# Patient Record
Sex: MALE | Race: WHITE | Employment: FULL TIME | URBAN - METROPOLITAN AREA
[De-identification: names, ages, dates, MRNs, and addresses within clinical notes are randomized per-mention and may not be internally consistent; named-entity substitution may affect disease eponyms.]

---

## 2017-12-14 ENCOUNTER — APPOINTMENT (OUTPATIENT)
Dept: CV DIAGNOSTICS | Facility: HOSPITAL | Age: 62
DRG: 038 | End: 2017-12-14
Attending: HOSPITALIST

## 2017-12-14 ENCOUNTER — HOSPITAL ENCOUNTER (INPATIENT)
Facility: HOSPITAL | Age: 62
LOS: 5 days | Discharge: HOME | DRG: 038 | End: 2017-12-19
Attending: EMERGENCY MEDICINE | Admitting: SURGERY

## 2017-12-14 ENCOUNTER — APPOINTMENT (OUTPATIENT)
Dept: GENERAL RADIOLOGY | Facility: HOSPITAL | Age: 62
DRG: 038 | End: 2017-12-14
Attending: EMERGENCY MEDICINE

## 2017-12-14 ENCOUNTER — APPOINTMENT (OUTPATIENT)
Dept: CT IMAGING | Age: 62
DRG: 038 | End: 2017-12-14
Attending: EMERGENCY MEDICINE

## 2017-12-14 ENCOUNTER — APPOINTMENT (OUTPATIENT)
Dept: MRI IMAGING | Facility: HOSPITAL | Age: 62
DRG: 038 | End: 2017-12-14
Attending: Other

## 2017-12-14 DIAGNOSIS — I65.23 BILATERAL CAROTID ARTERY STENOSIS: ICD-10-CM

## 2017-12-14 DIAGNOSIS — I63.9 ACUTE CVA (CEREBROVASCULAR ACCIDENT) (HCC): Primary | ICD-10-CM

## 2017-12-14 PROCEDURE — 70496 CT ANGIOGRAPHY HEAD: CPT | Performed by: EMERGENCY MEDICINE

## 2017-12-14 PROCEDURE — 93306 TTE W/DOPPLER COMPLETE: CPT | Performed by: HOSPITALIST

## 2017-12-14 PROCEDURE — 71010 XR CHEST AP PORTABLE  (CPT=71010): CPT | Performed by: EMERGENCY MEDICINE

## 2017-12-14 PROCEDURE — 99291 CRITICAL CARE FIRST HOUR: CPT | Performed by: HOSPITALIST

## 2017-12-14 PROCEDURE — 70551 MRI BRAIN STEM W/O DYE: CPT | Performed by: OTHER

## 2017-12-14 PROCEDURE — 70498 CT ANGIOGRAPHY NECK: CPT | Performed by: EMERGENCY MEDICINE

## 2017-12-14 PROCEDURE — 99291 CRITICAL CARE FIRST HOUR: CPT | Performed by: OTHER

## 2017-12-14 PROCEDURE — 70450 CT HEAD/BRAIN W/O DYE: CPT | Performed by: EMERGENCY MEDICINE

## 2017-12-14 RX ORDER — ASPIRIN 325 MG
325 TABLET ORAL DAILY
Status: DISCONTINUED | OUTPATIENT
Start: 2017-12-15 | End: 2017-12-19

## 2017-12-14 RX ORDER — METOCLOPRAMIDE HYDROCHLORIDE 5 MG/ML
10 INJECTION INTRAMUSCULAR; INTRAVENOUS EVERY 8 HOURS PRN
Status: DISCONTINUED | OUTPATIENT
Start: 2017-12-14 | End: 2017-12-18

## 2017-12-14 RX ORDER — ASPIRIN 300 MG
300 SUPPOSITORY, RECTAL RECTAL ONCE
Status: COMPLETED | OUTPATIENT
Start: 2017-12-14 | End: 2017-12-14

## 2017-12-14 RX ORDER — PHENYLEPHRINE HCL IN 0.9% NACL 50MG/250ML
PLASTIC BAG, INJECTION (ML) INTRAVENOUS CONTINUOUS PRN
Status: DISCONTINUED | OUTPATIENT
Start: 2017-12-14 | End: 2017-12-15

## 2017-12-14 RX ORDER — SODIUM CHLORIDE 9 MG/ML
INJECTION, SOLUTION INTRAVENOUS CONTINUOUS
Status: DISCONTINUED | OUTPATIENT
Start: 2017-12-14 | End: 2017-12-15

## 2017-12-14 RX ORDER — FAMOTIDINE 20 MG/1
20 TABLET ORAL 2 TIMES DAILY
Status: DISCONTINUED | OUTPATIENT
Start: 2017-12-14 | End: 2017-12-15

## 2017-12-14 RX ORDER — ACETAMINOPHEN 325 MG/1
650 TABLET ORAL EVERY 4 HOURS PRN
Status: DISCONTINUED | OUTPATIENT
Start: 2017-12-14 | End: 2017-12-18

## 2017-12-14 RX ORDER — SODIUM CHLORIDE 9 MG/ML
INJECTION, SOLUTION INTRAVENOUS CONTINUOUS
Status: DISCONTINUED | OUTPATIENT
Start: 2017-12-14 | End: 2017-12-14

## 2017-12-14 RX ORDER — SODIUM PHOSPHATE, DIBASIC AND SODIUM PHOSPHATE, MONOBASIC 7; 19 G/133ML; G/133ML
1 ENEMA RECTAL ONCE AS NEEDED
Status: DISCONTINUED | OUTPATIENT
Start: 2017-12-14 | End: 2017-12-18

## 2017-12-14 RX ORDER — DOCUSATE SODIUM 100 MG/1
100 CAPSULE, LIQUID FILLED ORAL 2 TIMES DAILY
Status: DISCONTINUED | OUTPATIENT
Start: 2017-12-14 | End: 2017-12-19

## 2017-12-14 RX ORDER — ONDANSETRON 2 MG/ML
4 INJECTION INTRAMUSCULAR; INTRAVENOUS EVERY 6 HOURS PRN
Status: DISCONTINUED | OUTPATIENT
Start: 2017-12-14 | End: 2017-12-18

## 2017-12-14 RX ORDER — ACETAMINOPHEN 325 MG/1
650 TABLET ORAL EVERY 6 HOURS PRN
Status: DISCONTINUED | OUTPATIENT
Start: 2017-12-14 | End: 2017-12-18

## 2017-12-14 RX ORDER — MORPHINE SULFATE 4 MG/ML
2 INJECTION, SOLUTION INTRAMUSCULAR; INTRAVENOUS EVERY 2 HOUR PRN
Status: DISCONTINUED | OUTPATIENT
Start: 2017-12-14 | End: 2017-12-14

## 2017-12-14 RX ORDER — TEMAZEPAM 15 MG/1
15 CAPSULE ORAL NIGHTLY PRN
Status: DISCONTINUED | OUTPATIENT
Start: 2017-12-14 | End: 2017-12-18

## 2017-12-14 RX ORDER — SENNOSIDES 8.6 MG
17.2 TABLET ORAL NIGHTLY
Status: DISCONTINUED | OUTPATIENT
Start: 2017-12-14 | End: 2017-12-18

## 2017-12-14 RX ORDER — LABETALOL HYDROCHLORIDE 5 MG/ML
10 INJECTION, SOLUTION INTRAVENOUS EVERY 10 MIN PRN
Status: DISCONTINUED | OUTPATIENT
Start: 2017-12-14 | End: 2017-12-18

## 2017-12-14 RX ORDER — HEPARIN SODIUM AND DEXTROSE 10000; 5 [USP'U]/100ML; G/100ML
1000 INJECTION INTRAVENOUS ONCE
Status: COMPLETED | OUTPATIENT
Start: 2017-12-14 | End: 2017-12-14

## 2017-12-14 RX ORDER — FAMOTIDINE 10 MG/ML
20 INJECTION, SOLUTION INTRAVENOUS 2 TIMES DAILY
Status: DISCONTINUED | OUTPATIENT
Start: 2017-12-14 | End: 2017-12-15

## 2017-12-14 RX ORDER — POLYETHYLENE GLYCOL 3350 17 G/17G
17 POWDER, FOR SOLUTION ORAL DAILY PRN
Status: DISCONTINUED | OUTPATIENT
Start: 2017-12-14 | End: 2017-12-19

## 2017-12-14 RX ORDER — ATORVASTATIN CALCIUM 80 MG/1
80 TABLET, FILM COATED ORAL NIGHTLY
Status: DISCONTINUED | OUTPATIENT
Start: 2017-12-14 | End: 2017-12-19

## 2017-12-14 RX ORDER — HEPARIN SODIUM AND DEXTROSE 10000; 5 [USP'U]/100ML; G/100ML
INJECTION INTRAVENOUS CONTINUOUS
Status: DISCONTINUED | OUTPATIENT
Start: 2017-12-14 | End: 2017-12-18

## 2017-12-14 RX ORDER — ASPIRIN 300 MG
300 SUPPOSITORY, RECTAL RECTAL DAILY
Status: DISCONTINUED | OUTPATIENT
Start: 2017-12-15 | End: 2017-12-19

## 2017-12-14 RX ORDER — ACETAMINOPHEN 650 MG/1
650 SUPPOSITORY RECTAL EVERY 4 HOURS PRN
Status: DISCONTINUED | OUTPATIENT
Start: 2017-12-14 | End: 2017-12-19

## 2017-12-14 RX ORDER — MORPHINE SULFATE 4 MG/ML
1 INJECTION, SOLUTION INTRAMUSCULAR; INTRAVENOUS EVERY 2 HOUR PRN
Status: DISCONTINUED | OUTPATIENT
Start: 2017-12-14 | End: 2017-12-14

## 2017-12-14 RX ORDER — BISACODYL 10 MG
10 SUPPOSITORY, RECTAL RECTAL
Status: DISCONTINUED | OUTPATIENT
Start: 2017-12-14 | End: 2017-12-19

## 2017-12-14 RX ORDER — NICOTINE 21 MG/24HR
1 PATCH, TRANSDERMAL 24 HOURS TRANSDERMAL DAILY
Status: DISCONTINUED | OUTPATIENT
Start: 2017-12-14 | End: 2017-12-19

## 2017-12-14 NOTE — PLAN OF CARE
1200 RECEIVED FROM ER, PT'S AWAKE, FOLLOWS COMMANDS. LEFT SIDE WEAKER THAN  RIGHT. PT C/O NUMBNESS AND TINGLING TO LEFT UPPER HAND AND ARM AND LEFT FOOT.1300 DR Kipp Cockayne AT BEDSIDE WITH ORDERS. [address]  [address]

## 2017-12-14 NOTE — PROGRESS NOTES
12/14/17 1250   Clinical Encounter Type   Visited With Patient and family together  (spouse and son were present by his bedside)   Routine Visit (Responded to the consult)   Continue Visiting ( remain available at the pager # [phone] anytime)   Olaf Reid

## 2017-12-14 NOTE — ED PROVIDER NOTES
Patient Seen in: THE [facility] Emergency Department In [place]    History   Patient presents with:  Stroke (neurologic)    Stated Complaint: left sided weakness, paralysis, woke up this morning    HPI    78-year-old male complaining of left-sided facial droo other systems reviewed and negative except as noted above.     Physical Exam   ED Triage Vitals [12/14/17 0725]  BP: (!) 174/80  Pulse: 88  Resp: 20  Temp: 98.3 °F (36.8 °C)  Temp src: Temporal  SpO2: 100 %  O2 Device: None (Room air)    Current:/69 -----------         ------                     CBC W/ DIFFERENTIAL[[id number]]          Abnormal            Final result                 Please view results for these tests on the individual orders.    [address] provider specified.       Medications Prescribed:  Current Discharge Medication List

## 2017-12-14 NOTE — ED INITIAL ASSESSMENT (HPI)
Pt woke up this morning w left side weakness at 0500. Pt went to bed last night without difficulty. Pt wife states his left arm was numb and kept hitting himself w left arm. Pt had left facial droop. Pt states no difficulty w left leg.

## 2017-12-14 NOTE — CONSULTS
[address] [facility] Interventional Neuro Radiology Consult    Luis Miguel Acuña Patient Status:  Inpatient    1955 MRN [id number]   [facility] 6NE-A Attending Payal Dunne MD   Hosp Day # 0 PCP Divine Camargo MD     [address] 4/3/2015     PAST SURGICAL HISTORY:  Past Surgical History:  No date: APPENDECTOMY  No date: HERNIA SURGERY  No date: TONSILLECTOMY    FAMILY HISTORY:  family history includes Cancer in his mother; Diabetes in his maternal grandmother; Heart Disorder in hi Oral Nightly PRN   magnesium hydroxide (MILK OF MAGNESIA) 400 MG/5ML suspension 30 mL 30 mL Oral Daily PRN   nicotine (NICODERM CQ) 21 MG/24HR 1 patch 1 patch Transdermal Daily       REVIEW OF SYSTEMS:  A 10-point system was reviewed.   Pertinent positives of the right parietal and left occipital white matter. 3. Critical test results were telephoned to Dr. Zaria Cantu in the emergency department at [phone] hr.  Read back was performed. CTA 12/14/2017  CONCLUSION:       1.   Branch vessel occlusion of an M2 branc

## 2017-12-14 NOTE — ED NOTES
Notified by the nursing supervisor that a icu bed will not be available.  This patient will be transferred to [facility]

## 2017-12-14 NOTE — H&P
[facility] HOSPITAL[facility]  History and Physical     Jeffreyelle Innocent Patient Status:  Inpatient    1955 MRN [id number]   [facility] 6NE-A Attending Akash Morrison MD   Hosp Day # 0 PCP Stephane Butler MD     Chief Complaint: left sided weaknes facility-administered medications on file prior to encounter. No current outpatient prescriptions on file prior to encounter. Review of Systems:   A comprehensive 14 point review of systems was completed.     Pertinent positives and negatives noted in patch      Total Critical care time:  Greater than 40 minutes.     Quality:  · DVT Prophylaxis: heparin drip  · CODE status: full  · Panchal: no    Plan of care discussed with ED physician    Carlos Manuel Salgado MD  12/14/2017

## 2017-12-15 ENCOUNTER — APPOINTMENT (OUTPATIENT)
Dept: CT IMAGING | Facility: HOSPITAL | Age: 62
DRG: 038 | End: 2017-12-15
Attending: Other

## 2017-12-15 PROCEDURE — 99232 SBSQ HOSP IP/OBS MODERATE 35: CPT | Performed by: HOSPITALIST

## 2017-12-15 PROCEDURE — 99233 SBSQ HOSP IP/OBS HIGH 50: CPT | Performed by: OTHER

## 2017-12-15 PROCEDURE — 70450 CT HEAD/BRAIN W/O DYE: CPT | Performed by: OTHER

## 2017-12-15 RX ORDER — SODIUM CHLORIDE 9 MG/ML
INJECTION, SOLUTION INTRAVENOUS CONTINUOUS
Status: DISCONTINUED | OUTPATIENT
Start: 2017-12-15 | End: 2017-12-19

## 2017-12-15 RX ORDER — FAMOTIDINE 20 MG/1
20 TABLET ORAL 2 TIMES DAILY
Status: DISCONTINUED | OUTPATIENT
Start: 2017-12-15 | End: 2017-12-19

## 2017-12-15 NOTE — OCCUPATIONAL THERAPY NOTE
OCCUPATIONAL THERAPY EVALUATION - INPATIENT     Room Number: 0747/6390-K  Evaluation Date: 12/15/2017  Type of Evaluation: Initial  Presenting Problem: CVA    Physician Order: IP Consult to Occupational Therapy  Reason for Therapy: ADL/IADL Dysfunction and 150-200)  Fall Risk: Standard fall risk    WEIGHT BEARING RESTRICTION  Weight Bearing Restriction: None                PAIN ASSESSMENT  Ratin  Location: no pain at this time       COGNITION  Overall Cognitive Status:  WFL - within functional limits safety provided. UE assessment completed and pt able to amb in the room and foss without AD. Educated pt on sensory re-education program that he can complete during the day and also coordination HEP.   Pt able to demonstrate sensory program and handout gi history including review of medical or therapy record   Specific performance deficits impacting engagement in ADL/IADL MODERATE  3 - 5 performance deficits   Client Assessment/Performance Deficits MODERATE - Comorbidities and min to mod modifications of ta

## 2017-12-15 NOTE — PROGRESS NOTES
[employer]  Neurocritical Care       Subjective: Tia Grimes is a(n) 58year old male s/p RMCA stroke and b/l carotid artery stenosis, on heparin drip, ASA and high dose Lipitor. On permissive HTN with SBP goal 150-200.     Review of #12:Tongue is midline. Power of tongue normal.        Motor: Tone and strength normal and symmetric. Left sided weakness 5-/5, right side is 5/5    Sensory: Normal and symmetric to light touch. Cerebellar: Finger-nose-finger intact. Gait: Deferred.   Yuniel Drone acute infarcts which are predominantly cortical in location involving the posterior right insular region, posterior right frontal lobe, right temple stem and anterior right parietal lobe. 2.  Mild to moderate chronic small vessel ischemic disease.    Nena Eliu hemorrhage or mass lesion. Mild atherosclerotic calcifications at the skull base. SINUSES:           No sign of acute sinusitis. Scattered areas of mucosal thickening without air-fluid level in the maxillary, ethmoid and sphenoid sinuses.   MASTOIDS: an M2 branch of the right middle cerebral artery within the right sylvian fissure best seen on image 520 series 3.   There is mild mixed atherosclerotic plaque in the cavernous and supra clinoid segments of the internal carotid arteries without hemodynamica cervical internal carotid artery is tortuous near the skull base. The vertebral arteries originate from the subclavian arteries.   The origins of the vertebral arteries are mildly limited in evaluation due to patient motion mild to moderate atherosclerotic 12/15/2017   CO2 25.0 12/15/2017   GLU 83 12/15/2017   CA 9.0 12/15/2017   ALB 3.8 12/14/2017   ALKPHO 97 12/14/2017   BILT 0.2 12/14/2017   TP 8.2 12/14/2017   AST 14 12/14/2017   ALT 21 12/14/2017   PTT 43.1 12/15/2017   INR 1.01 12/14/2017   MG 1.9 12/1 ondansetron HCl (ZOFRAN) injection 4 mg 4 mg Intravenous Q6H PRN   Or      Metoclopramide HCl (REGLAN) injection 10 mg 10 mg Intravenous Q8H PRN   famoTIDine (PEPCID) tab 20 mg 20 mg Oral BID   Or      famoTIDine (PEPCID) injection 20 mg 20 mg Intravenou ·   · IV Fluids NS at 75ml/hr      GI:  · GI Prophylaxis  · Bowel Regimen      Heme/ID:  · WBC 5.6  · Monitor H/H with goal hemoglobin more than 7gm/dl  Endocrine:  · Hyperglycemia protocol if req  Skin:  · Monitor for skin breakdown.   DVT Prophylaxis:

## 2017-12-15 NOTE — CM/SW NOTE
Pt seen for CVA protocol and self-pay status. Pt is a 58 y o male admitted for CVA. Pt lives with his wife who is an RN and works for pt's PCP Dr Clovis Garcia. Pt is self-pay.   Pt says he just got a new job and will not be eligible for insurance benefits unti

## 2017-12-15 NOTE — PROGRESS NOTES
[facility] HOSPITALIST  Progress Note     Antony Kimbrough Patient Status:  Inpatient    1955 MRN [id number]   [facility] 7NE-A Attending Alli Scanlon MD   Hosp Day # 1 PCP Gillian Escamilla MD     Chief Complaint: CVA    S: Patient feels wel right insular, post right frontal lobe, right temple stem and ant right parietal lobe CVA  2. Echo: P  3. Neuro following  4. ASA/statin  2. 95% b/l carotid stenosis  1. Heparin drip  2. Plan for CEA on mon  3. HTN  1. New dx  2. Permissive for now  4.  Pre

## 2017-12-15 NOTE — PROGRESS NOTES
12/15/17 1006   Clinical Encounter Type   Visited With Patient and family together  (spouse)   Routine Visit Follow-up   Continue Visiting (Sherly Cotto will remain available at the pager # [phone] as needed/requested)   Patient's Supportive Strategies/Resourc

## 2017-12-15 NOTE — PHYSICAL THERAPY NOTE
PHYSICAL THERAPY QUICK EVALUATION - INPATIENT    Room Number: 2814/1611-P  Evaluation Date: 12/15/2017  Presenting Problem:  (stroke)  Physician Order: PT Eval and Treat    Problem List  Principal Problem:    [facility]  A another person does the patient currently need. ..   -   Moving to and from a bed to a chair (including a wheelchair)?: None   -   Need to walk in hospital room?: None   -   Climbing 3-5 steps with a railing?: None       AM-PAC Score:  Raw Score: 24   PT Ap Patient discharged from Physical Therapy services. Please re-order if a new functional limitation presents during this admission. GOALS  Patient was able to achieve the following goals . ..     Patient was able to transfer Safely and independently   [facility]

## 2017-12-15 NOTE — PLAN OF CARE
Problem: NEUROLOGICAL - ADULT  Goal: Achieves stable or improved neurological status  INTERVENTIONS  - Assess for and report changes in neurological status  - Initiate measures to prevent increased intracranial pressure  - Maintain blood pressure and fluid pulses, skin color and temperature  - Assess for signs of decreased coronary artery perfusion - ex. Angina  - Evaluate fluid balance, assess for edema, trend weights   Outcome: Progressing  Blood pressure within ordered parameters, no sign of bleeding.  Andie Sauceda

## 2017-12-15 NOTE — SLP NOTE
ADULT SWALLOWING EVALUATION    ASSESSMENT    ASSESSMENT/OVERALL IMPRESSION:  Orders were received for a bedside swallowing evaluation per stroke protocol.  Patient was NPO on admit however he eventually passed the RN dysphagia screen and diet was ordered on (cerebrovascular accident) [facility])  Active Problems:    Bilateral carotid artery stenosis    Smoking greater than 25 pack years      Past Medical History  Past Medical History:   Diagnosis Date   • Gout    • High blood pressure    • Occlusion and stenosis of rule-out silent aspiration.)    Esophageal Phase of Swallow: No complaints consistent with possible esophageal involvement           GOALS  Goal #1 The patient will tolerate regular consistency and thin liquids without overt signs or symptoms of aspiration

## 2017-12-15 NOTE — CONSULTS
[facility]  Vascular Surgery Consultation    Briania Josephine Patient Status:  Inpatient    1955 MRN [id number]   [facility] 6NE-A Attending Zainab Holden MD   Hosp Day # 0 PCP Kelly Grimes MD     Reason for Consultation:  Right tobacco history on file. He reports that he drinks alcohol.     Allergies:  No Known Allergies    Medications:    Current Facility-Administered Medications:   •  0.9%  NaCl infusion, , Intravenous, Continuous  •  acetaminophen (TYLENOL) tab 650 mg, 650 mg, 200-3,000 Units/hr, Intravenous, Continuous    Review of Systems:    CONSTITUTIONAL: denies fever, chills  ENT: denies sore throat, nasal drainage/congestion  CHEST/CVS: denies cough, sputum, trouble breathing/SOB, chest pain, ROSE  GI: denies abdominal kaushal 12/14/2017   AST 14 12/14/2017   ALT 21 12/14/2017   PTT 34.1 12/14/2017   INR 1.01 12/14/2017   PTP 13.0 12/14/2017   PGLU 86 12/14/2017     I independently reviewed his CT scan he is appropriate for carotid endarterectomy on the right.   He will also need

## 2017-12-15 NOTE — PLAN OF CARE
Pt A/Ox4, slight Lt facial droop, Lt sided weakness per pt improving, Lt hand tingling, neuro q 4  On RA, NSR w/ PACs per tele, denies any pain  Pt up and ambulating with stand-by assist, tolerating well  Heparin gtt (ACS/Afib protocol) infusing at 1300 un

## 2017-12-15 NOTE — PAYOR COMM NOTE
--------------  ADMISSION REVIEW     Payor: N/A  Subscriber #:  [id number]  Authorization Number: N/A    Admit date: 12/14/17  Admit time: 1208       Admitting Physician: Homar Glasgow DO  Attending Physician:  Sarah Valera MD  Primary Car Abnormal; Notable for the following:     HGB 12.0 (*)     Monocyte Absolute 0.84 (*)     All other components within normal limits   CBC WITH DIFFERENTIAL WITH PLATELET    Narrative:      The following orders were created for panel order CBC WITH DIFFERENTI Pierre Lay RN    12/14/2017 2317 Rate/Dose Verify 1150 Units/hr Intravenous Anabel Simmons, RN      docusate sodium (COLACE) cap 100 mg     Date Action Dose Route User    12/15/2017 0748 Given 100 mg Oral Maninder Leal RN    12/14/2017 2012 Given 12/14/17 1223  · IV Fluids NS at 75ml/hr      GI:  · GI Prophylaxis  · Bowel Regimen      Heme/ID:  · WBC 6.2  · Monitor H/H with goal hemoglobin more than 7gm/dl  Endocrine:  · Hyperglycemia protocol if req  Skin:  · Monitor for skin breakdown.   Await Vas

## 2017-12-16 PROCEDURE — 99223 1ST HOSP IP/OBS HIGH 75: CPT | Performed by: OTHER

## 2017-12-16 PROCEDURE — 99232 SBSQ HOSP IP/OBS MODERATE 35: CPT | Performed by: HOSPITALIST

## 2017-12-16 NOTE — PLAN OF CARE
1745 Pt with increased Lt facial droop and slurred speech, more than earlier. Pt's SBP was in the 110-120's. Dr. Montano Peers paged and orders to give 0.9 500 ml bolus. Re-start 0.9 at 75 ml/hr. If no improvement get a STAT CT brain. 1845 Pt's /65.  L

## 2017-12-16 NOTE — PROGRESS NOTES
Patient without complaint. Overall his facial droop is significantly decreased, much improved symmetry  Plan for carotid endarterectomy next week.

## 2017-12-16 NOTE — PLAN OF CARE
Assumed care of patient at 1900. Upon initial assessment left facial droop noted. With increased time in room patient developed an increase in droop and intermittent left hand ataxia. Blood pressure at the time 135/85. Dr. Rafael Mejia notified.  Order for stat

## 2017-12-16 NOTE — CONSULTS
Neurology H&P    Nicolas Staples Patient Status:  Inpatient    1955 MRN [id number]   [facility] 7NE-A Attending Bubba Mukherjee MD   [facility] Day [facility] PCP Shahla Nina MD     Subjective:   Nicolas Staples is a(n) 58year old male with acute R Relation Age of Onset   • Heart Disorder Father      CHF   • Cancer Mother      lung CA   • Diabetes Maternal Grandmother        ROS:  Gen: no unexplained weight loss  Vision: no vision changes, no new blurry or double vision  Head and Neck: no eye or ear small to moderate-sized acute infarcts which are predominantly cortical in location involving the posterior right insular region, posterior right frontal lobe, right temple stem and anterior right parietal lobe.      2.  Mild to moderate chronic small vesse

## 2017-12-16 NOTE — PLAN OF CARE
Assumed care @ 0700  A&Ox4, VSS on RA, NSR on tele  Pt up to chair. SBP 130s so brought back to bed. SBP still 130s. Dr. Olga Lidia Torres updated. Order to restart IVF, bedrest and HOB flat. BP parameters adjusted  L facial droop and tingling to L upper arm noted.  Ishan Caraballo

## 2017-12-16 NOTE — PROGRESS NOTES
[facility]  Vascular Surgery Progress Note    Patsie Klinefelter Patient Status:  Inpatient    1955 MRN [id number]   [facility] 7NE-A Attending Ritesh Diaz MD   Hosp Day # 1 PCP Mati Mayen MD       Assessment and Plan: The tana Date   WBC 5.6 12/15/2017   HGB 12.1 12/15/2017   HCT 37.3 12/15/2017   .0 12/15/2017   CREATSERUM 1.01 12/15/2017   BUN 13 12/15/2017    12/15/2017   K 3.9 12/15/2017    12/15/2017   CO2 25.0 12/15/2017   GLU 83 12/15/2017   PTT 63.2 12

## 2017-12-16 NOTE — PROGRESS NOTES
[facility]  Progress Note     Ginger Lee Patient Status:  Inpatient    1955 MRN [id number]   [facility] 7NE-A Attending Warren Edwards MD   Hosp Day # 2 PCP Claudine Siu MD     Chief Complaint: CVA    S: Patient had some right frontal lobe, right temple stem and ant right parietal lobe CVA  2. Echo: EF 60%. No WMA  3. Neuro following  4. ASA/statin  2. 95% b/l carotid stenosis  1. Heparin drip  2. Plan for CEA on mon  3. Keep SBP>140 if possible  4. IVF  3. HTN  1.  New dx

## 2017-12-17 PROCEDURE — 99232 SBSQ HOSP IP/OBS MODERATE 35: CPT | Performed by: HOSPITALIST

## 2017-12-17 PROCEDURE — 99233 SBSQ HOSP IP/OBS HIGH 50: CPT | Performed by: OTHER

## 2017-12-17 NOTE — PLAN OF CARE
Assumed care of patient at 1900. Alert oriented x3. Vitals remain stable, within goal range at this time. Mild left facial droop. States tingling to left arm has not resolved. No complaints of pain.  SB/SR on tele with heart rates 40-50's with sleep with PA

## 2017-12-17 NOTE — PROGRESS NOTES
Neurology Progress Note    Ginger Lee Patient Status:  Inpatient    1955 MRN [id number]   [facility] 7NE-A Attending Warren Edwards MD   Hosp Day # 3 PCP Claudine Siu MD     Subjective:  Pt was seen and examined in bed this am.

## 2017-12-17 NOTE — PROGRESS NOTES
[facility]PITAL[facility]  Progress Note     Bettina Carla Patient Status:  Inpatient    1955 MRN [id number]   [facility] 7NE-A Attending Jack Aguirre MD   Hosp Day # 3 PCP Alina Farmer MD     Chief Complaint: CVA    S: Patient feels wel possible  4. IVF  3. HTN  1. New dx  2. Permissive for now  4. Pre-DM  1. A1c: 5.7  5. Nicotine Dependence  1. Nicotine patch       Plan of care: as above.  CEA this week      Quality:  · DVT Prophylaxis: heparin drip  · CODE status: full  · Panchal: no  · Ce

## 2017-12-18 ENCOUNTER — ANESTHESIA EVENT (OUTPATIENT)
Dept: CARDIAC SURGERY | Facility: HOSPITAL | Age: 62
DRG: 038 | End: 2017-12-18

## 2017-12-18 ENCOUNTER — SURGERY (OUTPATIENT)
Age: 62
End: 2017-12-18

## 2017-12-18 ENCOUNTER — ANESTHESIA (OUTPATIENT)
Dept: CARDIAC SURGERY | Facility: HOSPITAL | Age: 62
DRG: 038 | End: 2017-12-18

## 2017-12-18 PROCEDURE — 03UM0JZ SUPPLEMENT RIGHT EXTERNAL CAROTID ARTERY WITH SYNTHETIC SUBSTITUTE, OPEN APPROACH: ICD-10-PCS | Performed by: SURGERY

## 2017-12-18 PROCEDURE — 03CM0ZZ EXTIRPATION OF MATTER FROM RIGHT EXTERNAL CAROTID ARTERY, OPEN APPROACH: ICD-10-PCS | Performed by: SURGERY

## 2017-12-18 PROCEDURE — 03CK0ZZ EXTIRPATION OF MATTER FROM RIGHT INTERNAL CAROTID ARTERY, OPEN APPROACH: ICD-10-PCS | Performed by: SURGERY

## 2017-12-18 PROCEDURE — 99233 SBSQ HOSP IP/OBS HIGH 50: CPT | Performed by: OTHER

## 2017-12-18 PROCEDURE — 99232 SBSQ HOSP IP/OBS MODERATE 35: CPT | Performed by: HOSPITALIST

## 2017-12-18 PROCEDURE — 03UK0JZ SUPPLEMENT RIGHT INTERNAL CAROTID ARTERY WITH SYNTHETIC SUBSTITUTE, OPEN APPROACH: ICD-10-PCS | Performed by: SURGERY

## 2017-12-18 DEVICE — GRAFT PATCH FINESSE 0.3: Type: IMPLANTABLE DEVICE | Site: NECK | Status: FUNCTIONAL

## 2017-12-18 RX ORDER — ACETAMINOPHEN 325 MG/1
650 TABLET ORAL EVERY 4 HOURS PRN
Status: DISCONTINUED | OUTPATIENT
Start: 2017-12-18 | End: 2017-12-19

## 2017-12-18 RX ORDER — NALOXONE HYDROCHLORIDE 0.4 MG/ML
80 INJECTION, SOLUTION INTRAMUSCULAR; INTRAVENOUS; SUBCUTANEOUS AS NEEDED
Status: CANCELLED | OUTPATIENT
Start: 2017-12-18 | End: 2017-12-19

## 2017-12-18 RX ORDER — HYDROCODONE BITARTRATE AND ACETAMINOPHEN 5; 325 MG/1; MG/1
1 TABLET ORAL EVERY 4 HOURS PRN
Status: DISCONTINUED | OUTPATIENT
Start: 2017-12-18 | End: 2017-12-19

## 2017-12-18 RX ORDER — ONDANSETRON 2 MG/ML
4 INJECTION INTRAMUSCULAR; INTRAVENOUS AS NEEDED
Status: CANCELLED | OUTPATIENT
Start: 2017-12-18 | End: 2017-12-19

## 2017-12-18 RX ORDER — ACETAMINOPHEN 10 MG/ML
INJECTION, SOLUTION INTRAVENOUS
Status: DISCONTINUED | OUTPATIENT
Start: 2017-12-18 | End: 2017-12-18 | Stop reason: HOSPADM

## 2017-12-18 RX ORDER — HYDROMORPHONE HYDROCHLORIDE 1 MG/ML
0.5 INJECTION, SOLUTION INTRAMUSCULAR; INTRAVENOUS; SUBCUTANEOUS EVERY 5 MIN PRN
Status: DISCONTINUED | OUTPATIENT
Start: 2017-12-18 | End: 2017-12-18 | Stop reason: HOSPADM

## 2017-12-18 RX ORDER — GLYCOPYRROLATE 0.2 MG/ML
INJECTION, SOLUTION INTRAMUSCULAR; INTRAVENOUS
Status: COMPLETED
Start: 2017-12-18 | End: 2017-12-18

## 2017-12-18 RX ORDER — MEPERIDINE HYDROCHLORIDE 25 MG/ML
12.5 INJECTION INTRAMUSCULAR; INTRAVENOUS; SUBCUTANEOUS AS NEEDED
Status: CANCELLED | OUTPATIENT
Start: 2017-12-18 | End: 2017-12-19

## 2017-12-18 RX ORDER — ONDANSETRON 2 MG/ML
4 INJECTION INTRAMUSCULAR; INTRAVENOUS EVERY 6 HOURS PRN
Status: DISCONTINUED | OUTPATIENT
Start: 2017-12-18 | End: 2017-12-19

## 2017-12-18 RX ORDER — NALOXONE HYDROCHLORIDE 0.4 MG/ML
80 INJECTION, SOLUTION INTRAMUSCULAR; INTRAVENOUS; SUBCUTANEOUS AS NEEDED
Status: DISCONTINUED | OUTPATIENT
Start: 2017-12-18 | End: 2017-12-18 | Stop reason: HOSPADM

## 2017-12-18 RX ORDER — HYDROMORPHONE HYDROCHLORIDE 1 MG/ML
0.5 INJECTION, SOLUTION INTRAMUSCULAR; INTRAVENOUS; SUBCUTANEOUS EVERY 5 MIN PRN
Status: CANCELLED | OUTPATIENT
Start: 2017-12-18 | End: 2017-12-18

## 2017-12-18 RX ORDER — ONDANSETRON 2 MG/ML
4 INJECTION INTRAMUSCULAR; INTRAVENOUS AS NEEDED
Status: DISCONTINUED | OUTPATIENT
Start: 2017-12-18 | End: 2017-12-18 | Stop reason: HOSPADM

## 2017-12-18 RX ORDER — CEFAZOLIN SODIUM/WATER 2 G/20 ML
2 SYRINGE (ML) INTRAVENOUS EVERY 8 HOURS
Status: DISCONTINUED | OUTPATIENT
Start: 2017-12-19 | End: 2017-12-19

## 2017-12-18 RX ORDER — METOCLOPRAMIDE HYDROCHLORIDE 5 MG/ML
10 INJECTION INTRAMUSCULAR; INTRAVENOUS AS NEEDED
Status: CANCELLED | OUTPATIENT
Start: 2017-12-18 | End: 2017-12-19

## 2017-12-18 RX ORDER — DIPHENHYDRAMINE HYDROCHLORIDE 50 MG/ML
12.5 INJECTION INTRAMUSCULAR; INTRAVENOUS AS NEEDED
Status: CANCELLED | OUTPATIENT
Start: 2017-12-18 | End: 2017-12-19

## 2017-12-18 RX ORDER — MORPHINE SULFATE 4 MG/ML
2 INJECTION, SOLUTION INTRAMUSCULAR; INTRAVENOUS EVERY 2 HOUR PRN
Status: DISCONTINUED | OUTPATIENT
Start: 2017-12-18 | End: 2017-12-19

## 2017-12-18 RX ORDER — SODIUM CHLORIDE, SODIUM LACTATE, POTASSIUM CHLORIDE, CALCIUM CHLORIDE 600; 310; 30; 20 MG/100ML; MG/100ML; MG/100ML; MG/100ML
INJECTION, SOLUTION INTRAVENOUS CONTINUOUS
Status: DISCONTINUED | OUTPATIENT
Start: 2017-12-18 | End: 2017-12-18 | Stop reason: HOSPADM

## 2017-12-18 RX ORDER — MORPHINE SULFATE 4 MG/ML
INJECTION, SOLUTION INTRAMUSCULAR; INTRAVENOUS
Status: DISPENSED
Start: 2017-12-18 | End: 2017-12-19

## 2017-12-18 RX ORDER — ENOXAPARIN SODIUM 100 MG/ML
40 INJECTION SUBCUTANEOUS DAILY
Status: DISCONTINUED | OUTPATIENT
Start: 2017-12-19 | End: 2017-12-19

## 2017-12-18 RX ORDER — MEPERIDINE HYDROCHLORIDE 25 MG/ML
12.5 INJECTION INTRAMUSCULAR; INTRAVENOUS; SUBCUTANEOUS AS NEEDED
Status: DISCONTINUED | OUTPATIENT
Start: 2017-12-18 | End: 2017-12-18 | Stop reason: HOSPADM

## 2017-12-18 RX ORDER — CEFAZOLIN SODIUM 1 G/3ML
INJECTION, POWDER, FOR SOLUTION INTRAMUSCULAR; INTRAVENOUS
Status: DISCONTINUED | OUTPATIENT
Start: 2017-12-18 | End: 2017-12-18 | Stop reason: HOSPADM

## 2017-12-18 RX ORDER — METOCLOPRAMIDE HYDROCHLORIDE 5 MG/ML
10 INJECTION INTRAMUSCULAR; INTRAVENOUS AS NEEDED
Status: DISCONTINUED | OUTPATIENT
Start: 2017-12-18 | End: 2017-12-18 | Stop reason: HOSPADM

## 2017-12-18 RX ORDER — MORPHINE SULFATE 4 MG/ML
1 INJECTION, SOLUTION INTRAMUSCULAR; INTRAVENOUS EVERY 2 HOUR PRN
Status: DISCONTINUED | OUTPATIENT
Start: 2017-12-18 | End: 2017-12-19

## 2017-12-18 RX ORDER — HYDROMORPHONE HYDROCHLORIDE 1 MG/ML
INJECTION, SOLUTION INTRAMUSCULAR; INTRAVENOUS; SUBCUTANEOUS
Status: COMPLETED
Start: 2017-12-18 | End: 2017-12-18

## 2017-12-18 RX ORDER — BUPIVACAINE HYDROCHLORIDE AND EPINEPHRINE 5; 5 MG/ML; UG/ML
INJECTION, SOLUTION EPIDURAL; INTRACAUDAL; PERINEURAL AS NEEDED
Status: DISCONTINUED | OUTPATIENT
Start: 2017-12-18 | End: 2017-12-18 | Stop reason: HOSPADM

## 2017-12-18 RX ORDER — GLYCOPYRROLATE 0.2 MG/ML
0.2 INJECTION, SOLUTION INTRAMUSCULAR; INTRAVENOUS ONCE
Status: COMPLETED | OUTPATIENT
Start: 2017-12-18 | End: 2017-12-18

## 2017-12-18 RX ORDER — SODIUM CHLORIDE, SODIUM LACTATE, POTASSIUM CHLORIDE, CALCIUM CHLORIDE 600; 310; 30; 20 MG/100ML; MG/100ML; MG/100ML; MG/100ML
INJECTION, SOLUTION INTRAVENOUS CONTINUOUS
Status: CANCELLED | OUTPATIENT
Start: 2017-12-18

## 2017-12-18 RX ORDER — MORPHINE SULFATE 4 MG/ML
4 INJECTION, SOLUTION INTRAMUSCULAR; INTRAVENOUS EVERY 2 HOUR PRN
Status: DISCONTINUED | OUTPATIENT
Start: 2017-12-18 | End: 2017-12-19

## 2017-12-18 RX ORDER — HYDROCODONE BITARTRATE AND ACETAMINOPHEN 5; 325 MG/1; MG/1
2 TABLET ORAL EVERY 4 HOURS PRN
Status: DISCONTINUED | OUTPATIENT
Start: 2017-12-18 | End: 2017-12-19

## 2017-12-18 NOTE — PLAN OF CARE
Pt A/O X4. Neuro checks q4hrs. RA. NSR, SB on tele. VSS. Heparin running at 1150 units/hr. Voids per urinal. Denies pain. Per Dr. Edgar Sarabia pt will have Right carotid endarterectomy on 12/18 at 1500. NPO after midnight. Consent signed.  Pt is sleeping comfort

## 2017-12-18 NOTE — PLAN OF CARE
Patient given smoking cessation information. Plans on quitting. Wearing patch. Reports LUE feels the same on the right as the left arm. LLE still has decrease sensation, but patient can tell it has improved.  No signs of choking when taking pills this am. W

## 2017-12-18 NOTE — PLAN OF CARE
Patient taken for surgery at 3:30p. VSS. No changes in Neuro status. All bedside belongings taken by family.

## 2017-12-18 NOTE — PROGRESS NOTES
[facility]ITAL[facility]  Progress Note     Reagan Denton Patient Status:  Inpatient    1955 MRN [id number]   [facility] 7NE-A Attending Deshaun Barger MD   [facility] Day # 4 PCP Petros Yang MD     Chief Complaint: CVA    S: Patient reports n following  4. ASA/statin  5. BP parameters per neuro  2. B/l carotid stenosis  1. Heparin drip, vascular surgery  2. Pending CEA this week  3. Keep SBP>140 if possible  3. HTN, New dx  1. Permissive for now  4. Pre-DM  1. A1c: 5.7  5.  Nicotine Dependence

## 2017-12-18 NOTE — PLAN OF CARE
Assumed care @ 0700  A&Ox4, VSS on RA, NSR on tele  L facial droop and decreased sensation to L side noted. No further neuro changes. [facility]130 if no change to neuro status. Goal -150. IVF infusing.  Maintained bedrest. HOB fl

## 2017-12-18 NOTE — PLAN OF CARE
CARDIOVASCULAR - ADULT    • Maintains optimal cardiac output and hemodynamic stability Progressing        GASTROINTESTINAL - ADULT    • Maintains adequate nutritional intake (undernourished) Progressing        GENITOURINARY - ADULT    • Absence of urinary

## 2017-12-18 NOTE — OCCUPATIONAL THERAPY NOTE
Order for [facility] to be flat. Pt is scheduled for carotid endarterectomy this afternoon. OT will attempt to see the patient tomorrow or when pt is appropriate for therapy. Spoke with RN.

## 2017-12-18 NOTE — PROGRESS NOTES
[id number] [facility] Neurology Progress Note    Lopez Townsend Patient Status:  Inpatient    1955 MRN [id number]   [facility] 7NE-A Attending Roswell Sicard, MD   [address] Day # 4 PCP Nicky Montoya MD         Subjective:   Lopez Townsend is Transdermal Daily       Patient Active Problem List:     Smoking addiction     Occlusion and stenosis of carotid artery without mention of cerebral infarction     Acute CVA (cerebrovascular accident) ([facility] [address]     Bilateral carotid artery stenosis     Smoking

## 2017-12-18 NOTE — SLP NOTE
Pt preparing for carotid endarterectomy this afternoon and not available for SLP tx/communication evaluation. SLP to re-attempt 12/19/17.    Karina Mena M.S., CCC-SLP/L

## 2017-12-18 NOTE — ANESTHESIA PREPROCEDURE EVALUATION
PRE-OP EVALUATION    Patient Name: Dav Lane    Pre-op Diagnosis: carotid stenosis    Procedure(s):      Surgeon(s) and Role:     Claudeen Drivers, MD - Primary    Pre-op vitals reviewed.   Temp: 97.7 °F (36.5 °C)  Pulse: 62  Resp: 17  BP: 148/81  S Intravenous Q6H PRN   Or      [MAR Hold] Metoclopramide HCl (REGLAN) injection 10 mg 10 mg Intravenous Q8H PRN   [MAR Hold] acetaminophen (TYLENOL) tab 650 mg 650 mg Oral Q6H PRN   [MAR Hold] temazepam (RESTORIL) cap 15 mg 15 mg Oral Nightly PRN   [MAR Hol comparison. *    ----------------------------------------------------------------------------  *  Left ventricle:  The cavity size was normal. Wall thickness was normal.  Systolic function was normal. The estimated ejection fraction was 60%.  There  was no History:  No date: APPENDECTOMY  No date: HERNIA SURGERY  No date: TONSILLECTOMY     Smoking status: Current Every Day Smoker  0.50 Packs/day     Types: Cigarettes    Smokeless tobacco: Not on file    Alcohol use Yes       Drug use: Unknown     Available p

## 2017-12-19 VITALS
OXYGEN SATURATION: 98 % | HEIGHT: 75 IN | DIASTOLIC BLOOD PRESSURE: 72 MMHG | BODY MASS INDEX: 24.62 KG/M2 | HEART RATE: 67 BPM | WEIGHT: 198 LBS | SYSTOLIC BLOOD PRESSURE: 158 MMHG | TEMPERATURE: 98 F | RESPIRATION RATE: 18 BRPM

## 2017-12-19 PROCEDURE — 99239 HOSP IP/OBS DSCHRG MGMT >30: CPT | Performed by: HOSPITALIST

## 2017-12-19 PROCEDURE — 99233 SBSQ HOSP IP/OBS HIGH 50: CPT | Performed by: OTHER

## 2017-12-19 RX ORDER — ASPIRIN 325 MG
325 TABLET ORAL DAILY
Qty: 30 TABLET | Refills: 0 | Status: SHIPPED | COMMUNITY
Start: 2017-12-20

## 2017-12-19 RX ORDER — ATORVASTATIN CALCIUM 80 MG/1
80 TABLET, FILM COATED ORAL NIGHTLY
Qty: 30 TABLET | Refills: 2 | Status: SHIPPED | OUTPATIENT
Start: 2017-12-19 | End: 2018-03-12

## 2017-12-19 NOTE — BRIEF OP NOTE
Pre-Operative Diagnosis: Symptomatic right carotid stenosis with right cerebral-vascular accident, severe left carotid stenosis     Post-Operative Diagnosis: same     Procedure Performed:   Procedure(s):  RIGHT CAROTID ENDARTECTOMY     Surgeon(s) and Cheryl

## 2017-12-19 NOTE — PROGRESS NOTES
[address] [facility] Neurology Progress Note    Jodie Dang Patient Status:  Inpatient    1955 MRN [id number]   [facility] 7NE-A Attending Momo Burrell MD   [facility] Day # 5 PCP Laury Worrell MD         Subjective:   Jodie Dang is I have seen patient independently, reviewed history, labs and imaging, and agree with above note with following additions:  No new symptoms, s/p right CEA  O: /70 (BP Location: Right arm)   Pulse 67   Temp 98.2 °F (36.8 °C) (Oral)   Resp 18   Ht 75\"

## 2017-12-19 NOTE — OCCUPATIONAL THERAPY NOTE
Attempted to see pt this AM, pt with t/f to CNICU after Right Carotid Endartectomy under general anesthesia, OT will place pt ON HOLD at this time, please re-order if approp.

## 2017-12-19 NOTE — PROGRESS NOTES
The patient is doing well postoperatively. He is back to his neurologic baseline from the immediate preoperative period. The patient may be discharged today. He may shower. He should not drive for 24 hours. His activity level will be as tolerated.   I

## 2017-12-19 NOTE — PROGRESS NOTES
Doing well  Slight left facial droop   Tongue midline  Strength in arms equal    Will transfer to tele to monitor for symptom resolution  Possible discharge tomorrow

## 2017-12-19 NOTE — PAYOR COMM NOTE
--------------  CONTINUED STAY REVIEW    Payor: N/A  Subscriber #:  [id number]  Authorization Number: N/A    Admit date: 12/14/17  Admit time: [address]    Admitting Physician: Atilio Olson MD  Attending Physician:  Kelly Mendez MD  Primary Care Physic Action Dose Route User    12/18/2017 1820 Given 1 each Topical Jos Estrada MD      glycopyrrolate (ROBINUL) 0.2 MG/ML injection 0.2 mg     Date Action Dose Route User    12/18/2017 2029 Given 0.2 mg Intravenous Belkis Lima RN      heparin 500 flat  - NS 100cc/hr  - BP goals >130/90  - Plan for CEA this week  12/18  Procedure Performed:   Procedure(s):  RIGHT CAROTID ENDARTECTOMY   12/19  Cont  mg, lipitor 80 mg  Stroke education given  Stroke risk factors management    PLEASE FAX DAYS CE

## 2017-12-19 NOTE — OCCUPATIONAL THERAPY NOTE
OCCUPATIONAL THERAPY QUICK EVALUATION - INPATIENT    Room Number: 0256/6462-F  Evaluation Date: 12/19/2017     Type of Evaluation: Re-evaluation and Quick Eval  Presenting Problem: CVA    Physician Order: IP Consult to Occupational Therapy  Reason for Ther decreased speed;Left decreased accuracy             ACTIVITIES OF DAILY LIVING ASSESSMENT  AM-PAC ‘6-Clicks’ Inpatient Daily Activity Short Form   How much help from another person does the patient currently need…  -   Putting on and taking off regular low of medical or therapy records    Specific performance deficits impacting engagement in ADL/IADL  LOW  1 - 3 performance deficits    Client Assessment/Performance Deficits  LOW - No comorbidities nor modifications of tasks    Clinical Decision Making  LOW -

## 2017-12-19 NOTE — PHYSICAL THERAPY NOTE
PHYSICAL THERAPY QUICK EVALUATION - INPATIENT    Room Number: 1391/4402-P  Evaluation Date: 12/19/2017  Presenting Problem: CVA s/p R CEA 12/18  Physician Order: PT Eval and Treat  History:  Initial admit 12/14 with L sided weakness and slurred speech, + Symmetrical  Coordination - Rapid Alternating Movement: Symmetrical  Sensation: light touch grossly intct BLEs  Tone: WFL AROM BLEs in supine    AM-PAC '6-Clicks' INPATIENT SHORT FORM - BASIC MOBILITY  How much difficulty does the patient currently have. Alisa Fragoso male admitted on 12/14/2017 for CVA now s/p CEA 12/18. Pertinent comorbidities and personal factors impacting therapy include CVA, tobacco.  Functional outcome measures completed include AMPAC.   Based on this evaluation, patient's clinical presentation is

## 2017-12-19 NOTE — DISCHARGE SUMMARY
[facility] HOSPITALIST  DISCHARGE SUMMARY     Luis Miguel Acuña Patient Status:  Inpatient    1955 MRN [id number]   [facility] 7NE-A Attending Payal Dunne MD   [facility] Day # 5 PCP Divine Camargo MD     Date of Admission: 2017  Date of D pathology but did show old infarcts and also revealed 95% stenosis in bilateral carotid bulbs.       Brief Synopsis: Aden Alexander is a 58year old male who presented with stroke and imaging confirmed R MCA CVA and b/l carotid stenosis.  He was admitted [place] [id number]  [phone]    Schedule an appointment as soon as possible for a visit in 3 weeks      SHAILESH Harkins [address]  [address] [facility] [phone]    On 12/29/2017  1pm for stroke follow up    Tabby Garcia MD

## 2017-12-19 NOTE — PROGRESS NOTES
[facility] HOSPITALIST  Progress Note     Ezequiel Mena Patient Status:  Inpatient    1955 MRN [id number]   [facility] 7NE-A Attending Nandini George MD   [facility] Day # 5 PCP Blanche De La Cruz MD     Chief Complaint: CVA    S: Patient reports n parietal lobe CVA  2. Echo: EF 60%. No WMA  3. Neuro following  4. ASA/statin  5. BP parameters per neuro  2. B/l carotid stenosis s/p R CEA  1. vascular surgery  3. HTN, New dx  1. Continue mgmt  4. Pre-DM  1. A1c: 5.7  5. Nicotine Dependence  1.  Nicotine

## 2017-12-19 NOTE — ANESTHESIA POSTPROCEDURE EVALUATION
[address] Patient Status:  Inpatient   Age/Gender 58year old male MRN [id number]   Location [facility] Attending Yoshi Nazario MD   [facility] Day # 4 PCP Ruby Ghosh MD       Anesthesia Post-op Note

## 2017-12-20 NOTE — CM/SW NOTE
12/20/17 0900   Discharge disposition   Discharged to: Home or Self   Name of [facility] services after discharge None   Discharge transportation Private car

## 2017-12-20 NOTE — PLAN OF CARE
NURSING DISCHARGE NOTE    Discharged home via wheelchair. Accompanied by wife. Belongings taken by patient. D/C instructions, meds, follow up appt explained to patient and wife with understanding verbalized. IV & tele d/c'd.  Rx for Lipitor given to

## 2017-12-20 NOTE — OPERATIVE REPORT
[address]    PATIENT'S NAME: Cyndie Salguero   ATTENDING PHYSICIAN: Abel Robert M.D. OPERATING PHYSICIAN: Gavino Dill M.D.    PATIENT ACCOUNT#:   [de-identified]    LOCATION:  [address] [place] [address]  MEDICAL RECORD #:   [id number]       DATE OF BIRTH: the internal carotid artery, and indwelling Sundt loop endarterectomy shunt was placed in the usual manner and flow reestablished to the internal carotid artery. An endarterectomy was then performed of the common internal and external carotid arteries.   Sunshine Cobian

## 2017-12-29 ENCOUNTER — OFFICE VISIT (OUTPATIENT)
Dept: NEUROLOGY | Facility: CLINIC | Age: 62
End: 2017-12-29

## 2017-12-29 VITALS
RESPIRATION RATE: 16 BRPM | WEIGHT: 199 LBS | SYSTOLIC BLOOD PRESSURE: 154 MMHG | BODY MASS INDEX: 25 KG/M2 | DIASTOLIC BLOOD PRESSURE: 80 MMHG | HEART RATE: 64 BPM

## 2017-12-29 DIAGNOSIS — I63.9 CEREBROVASCULAR ACCIDENT (CVA), UNSPECIFIED MECHANISM (HCC): Primary | ICD-10-CM

## 2017-12-29 PROCEDURE — 99213 OFFICE O/P EST LOW 20 MIN: CPT | Performed by: NURSE PRACTITIONER

## 2017-12-29 RX ORDER — LISINOPRIL 10 MG/1
10 TABLET ORAL DAILY
COMMUNITY
End: 2018-01-19

## 2017-12-29 NOTE — PATIENT INSTRUCTIONS
Refill policies:    • Allow 2-3 business days for refills; controlled substances may take longer.   • Contact your pharmacy at least 5 days prior to running out of medication and have them send an electronic request or submit request through the [facility] have a procedure or additional testing performed. [facility]) will contact your insurance carrier to obtain pre-certification or prior authorization.     Unfortunately, [facility] has seen an increase in denial of payment even though the p

## 2017-12-29 NOTE — PROGRESS NOTES
[facility] Neurology Outpatient Stroke Follow Up  Date of service: 12/29/2017    Patient here for a follow-up visit for CVA. Discharged from [facility] on 12/19/2017.     Elizabeth Boyle is a 58year old, male with a PMH of cigarette smoking and carotid diseas and anterior right parietal lobe. Mild to moderate chronic small vessel ischemic disease. Echocardiogram on 12/14/2017 showed an EF of 60%.   Lipid panel from 12/14/2017- Chol: 199,  HDL: 52, Triglycerides: 69; LDL: 133  HgbA1c from 12/14/2017: 5.7  PHQ9 follow commands. Face is slightly asymmetrical with a left facial droop. PERRL +3 brisk. EOMI. VFF. Tongue midline. Uvula and palate elevate symmetrically. Shoulder shrug normal bilaterally. Remaining CNs 2-12 GI.   Sensation to light touch is intact bi Dr. Silvina Sidhu, on 1/19/18 at [place] [address], [facility] General  Pager [phone]  12/29/2017, 2:13 PM

## 2018-01-19 ENCOUNTER — OFFICE VISIT (OUTPATIENT)
Dept: NEUROLOGY | Facility: CLINIC | Age: 63
End: 2018-01-19

## 2018-01-19 VITALS
WEIGHT: 196 LBS | BODY MASS INDEX: 25 KG/M2 | SYSTOLIC BLOOD PRESSURE: 128 MMHG | RESPIRATION RATE: 16 BRPM | DIASTOLIC BLOOD PRESSURE: 64 MMHG | HEART RATE: 60 BPM

## 2018-01-19 DIAGNOSIS — I77.9 BILATERAL CAROTID ARTERY DISEASE (HCC): ICD-10-CM

## 2018-01-19 DIAGNOSIS — Z98.890 STATUS POST CAROTID SURGERY: ICD-10-CM

## 2018-01-19 DIAGNOSIS — I69.30 CHRONIC ISCHEMIC RIGHT MCA STROKE: Primary | ICD-10-CM

## 2018-01-19 PROCEDURE — 99213 OFFICE O/P EST LOW 20 MIN: CPT | Performed by: OTHER

## 2018-01-19 NOTE — PATIENT INSTRUCTIONS
Refill policies:    • Allow 2-3 business days for refills; controlled substances may take longer.   • Contact your pharmacy at least 5 days prior to running out of medication and have them send an electronic request or submit request through the [facility] recommended that you have a procedure or additional testing performed. [facility]) will contact your insurance carrier to obtain pre-certification or prior authorization.     Unfortunately, [facility] has seen an increase in denial of paym

## 2018-01-19 NOTE — PROGRESS NOTES
Patient here to follow up regarding a stroke in 12/2017. States overall is doing well, but still has tingling on left upper extremity.

## 2018-02-19 RX ORDER — NICOTINE 21 MG/24HR
PATCH, TRANSDERMAL 24 HOURS TRANSDERMAL EVERY 24 HOURS
COMMUNITY

## 2018-03-07 ENCOUNTER — ANESTHESIA EVENT (OUTPATIENT)
Dept: CARDIAC SURGERY | Facility: HOSPITAL | Age: 63
End: 2018-03-07

## 2018-03-07 ENCOUNTER — SURGERY (OUTPATIENT)
Age: 63
End: 2018-03-07

## 2018-03-07 ENCOUNTER — ANESTHESIA (OUTPATIENT)
Dept: CARDIAC SURGERY | Facility: HOSPITAL | Age: 63
End: 2018-03-07

## 2018-03-07 ENCOUNTER — HOSPITAL ENCOUNTER (INPATIENT)
Facility: HOSPITAL | Age: 63
LOS: 1 days | Discharge: HOME | DRG: 039 | End: 2018-03-08
Attending: SURGERY | Admitting: SURGERY

## 2018-03-07 PROBLEM — I77.9 BILATERAL CAROTID ARTERY DISEASE (HCC): Status: ACTIVE | Noted: 2018-03-07

## 2018-03-07 LAB
ANTIBODY SCREEN: NEGATIVE
BUN BLD-MCNC: 23 MG/DL (ref 8–20)
CALCIUM BLD-MCNC: 9.7 MG/DL (ref 8.3–10.3)
CHLORIDE: 106 MMOL/L (ref 101–111)
CO2: 25 MMOL/L (ref 22–32)
CREAT BLD-MCNC: 1 MG/DL (ref 0.7–1.3)
GLUCOSE BLD-MCNC: 97 MG/DL (ref 70–99)
GLUCOSE BLD-MCNC: 99 MG/DL (ref 70–99)
ISTAT ACTIVATED CLOTTING TIME: 142 SECONDS (ref 74–137)
ISTAT BLOOD GAS BASE EXCESS: 1 MMOL/L
ISTAT BLOOD GAS HCO3: 26.5 MEQ/L (ref 22–26)
ISTAT BLOOD GAS O2 SATURATION: 100 % (ref 92–100)
ISTAT BLOOD GAS PCO2: 46 MMHG (ref 35–45)
ISTAT BLOOD GAS PH: 7.37 (ref 7.35–7.45)
ISTAT BLOOD GAS PO2: 411 MMHG (ref 80–105)
ISTAT BLOOD GAS TCO2: 28 MMOL/L (ref 22–32)
ISTAT HEMATOCRIT: 33 % (ref 37–53)
ISTAT IONIZED CALCIUM: 1.21 MMOL/L (ref 1.12–1.32)
ISTAT POTASSIUM: 3.9 MMOL/L (ref 3.6–5.1)
ISTAT SODIUM: 139 MMOL/L (ref 136–144)
POTASSIUM SERPL-SCNC: 4 MMOL/L (ref 3.6–5.1)
RH BLOOD TYPE: POSITIVE
SODIUM SERPL-SCNC: 139 MMOL/L (ref 136–144)

## 2018-03-07 PROCEDURE — 03CL0ZZ EXTIRPATION OF MATTER FROM LEFT INTERNAL CAROTID ARTERY, OPEN APPROACH: ICD-10-PCS | Performed by: SURGERY

## 2018-03-07 PROCEDURE — 84295 ASSAY OF SERUM SODIUM: CPT

## 2018-03-07 PROCEDURE — 87081 CULTURE SCREEN ONLY: CPT | Performed by: SURGERY

## 2018-03-07 PROCEDURE — 93005 ELECTROCARDIOGRAM TRACING: CPT

## 2018-03-07 PROCEDURE — 85014 HEMATOCRIT: CPT

## 2018-03-07 PROCEDURE — 03UL0JZ SUPPLEMENT LEFT INTERNAL CAROTID ARTERY WITH SYNTHETIC SUBSTITUTE, OPEN APPROACH: ICD-10-PCS | Performed by: SURGERY

## 2018-03-07 PROCEDURE — 82803 BLOOD GASES ANY COMBINATION: CPT

## 2018-03-07 PROCEDURE — 86901 BLOOD TYPING SEROLOGIC RH(D): CPT | Performed by: SURGERY

## 2018-03-07 PROCEDURE — 84132 ASSAY OF SERUM POTASSIUM: CPT

## 2018-03-07 PROCEDURE — 86900 BLOOD TYPING SEROLOGIC ABO: CPT | Performed by: SURGERY

## 2018-03-07 PROCEDURE — 85347 COAGULATION TIME ACTIVATED: CPT

## 2018-03-07 PROCEDURE — 80048 BASIC METABOLIC PNL TOTAL CA: CPT | Performed by: SURGERY

## 2018-03-07 PROCEDURE — 88311 DECALCIFY TISSUE: CPT | Performed by: SURGERY

## 2018-03-07 PROCEDURE — 93010 ELECTROCARDIOGRAM REPORT: CPT | Performed by: INTERNAL MEDICINE

## 2018-03-07 PROCEDURE — 86850 RBC ANTIBODY SCREEN: CPT | Performed by: SURGERY

## 2018-03-07 PROCEDURE — 82330 ASSAY OF CALCIUM: CPT

## 2018-03-07 PROCEDURE — 88304 TISSUE EXAM BY PATHOLOGIST: CPT | Performed by: SURGERY

## 2018-03-07 PROCEDURE — 86920 COMPATIBILITY TEST SPIN: CPT

## 2018-03-07 DEVICE — GRAFT PATCH FINESSE 0.3: Type: IMPLANTABLE DEVICE | Site: NECK | Status: FUNCTIONAL

## 2018-03-07 RX ORDER — ACETAMINOPHEN 325 MG/1
650 TABLET ORAL EVERY 4 HOURS PRN
Status: DISCONTINUED | OUTPATIENT
Start: 2018-03-07 | End: 2018-03-08

## 2018-03-07 RX ORDER — ASPIRIN 325 MG
325 TABLET, DELAYED RELEASE (ENTERIC COATED) ORAL DAILY
Status: DISCONTINUED | OUTPATIENT
Start: 2018-03-07 | End: 2018-03-08

## 2018-03-07 RX ORDER — ENOXAPARIN SODIUM 100 MG/ML
40 INJECTION SUBCUTANEOUS DAILY
Status: DISCONTINUED | OUTPATIENT
Start: 2018-03-07 | End: 2018-03-08

## 2018-03-07 RX ORDER — ASPIRIN 325 MG
325 TABLET ORAL DAILY
Status: DISCONTINUED | OUTPATIENT
Start: 2018-03-07 | End: 2018-03-07

## 2018-03-07 RX ORDER — ATORVASTATIN CALCIUM 80 MG/1
80 TABLET, FILM COATED ORAL NIGHTLY
Status: DISCONTINUED | OUTPATIENT
Start: 2018-03-07 | End: 2018-03-08

## 2018-03-07 RX ORDER — MORPHINE SULFATE 4 MG/ML
4 INJECTION, SOLUTION INTRAMUSCULAR; INTRAVENOUS EVERY 2 HOUR PRN
Status: DISCONTINUED | OUTPATIENT
Start: 2018-03-07 | End: 2018-03-08

## 2018-03-07 RX ORDER — HYDROCODONE BITARTRATE AND ACETAMINOPHEN 5; 325 MG/1; MG/1
2 TABLET ORAL EVERY 4 HOURS PRN
Status: DISCONTINUED | OUTPATIENT
Start: 2018-03-07 | End: 2018-03-08

## 2018-03-07 RX ORDER — DEXTROSE, SODIUM CHLORIDE, AND POTASSIUM CHLORIDE 5; .45; .15 G/100ML; G/100ML; G/100ML
INJECTION INTRAVENOUS CONTINUOUS
Status: DISCONTINUED | OUTPATIENT
Start: 2018-03-07 | End: 2018-03-08

## 2018-03-07 RX ORDER — MORPHINE SULFATE 4 MG/ML
1 INJECTION, SOLUTION INTRAMUSCULAR; INTRAVENOUS EVERY 2 HOUR PRN
Status: DISCONTINUED | OUTPATIENT
Start: 2018-03-07 | End: 2018-03-08

## 2018-03-07 RX ORDER — MORPHINE SULFATE 4 MG/ML
2 INJECTION, SOLUTION INTRAMUSCULAR; INTRAVENOUS EVERY 2 HOUR PRN
Status: DISCONTINUED | OUTPATIENT
Start: 2018-03-07 | End: 2018-03-08

## 2018-03-07 RX ORDER — CEFAZOLIN SODIUM/WATER 2 G/20 ML
2 SYRINGE (ML) INTRAVENOUS EVERY 8 HOURS
Status: COMPLETED | OUTPATIENT
Start: 2018-03-07 | End: 2018-03-08

## 2018-03-07 RX ORDER — CEFAZOLIN SODIUM 1 G/3ML
INJECTION, POWDER, FOR SOLUTION INTRAMUSCULAR; INTRAVENOUS
Status: DISCONTINUED | OUTPATIENT
Start: 2018-03-07 | End: 2018-03-07 | Stop reason: HOSPADM

## 2018-03-07 RX ORDER — ONDANSETRON 2 MG/ML
4 INJECTION INTRAMUSCULAR; INTRAVENOUS EVERY 6 HOURS PRN
Status: DISCONTINUED | OUTPATIENT
Start: 2018-03-07 | End: 2018-03-08

## 2018-03-07 RX ORDER — HEPARIN SODIUM 5000 [USP'U]/ML
5000 INJECTION, SOLUTION INTRAVENOUS; SUBCUTANEOUS ONCE
Status: DISCONTINUED | OUTPATIENT
Start: 2018-03-07 | End: 2018-03-07 | Stop reason: HOSPADM

## 2018-03-07 RX ORDER — NICOTINE 21 MG/24HR
1 PATCH, TRANSDERMAL 24 HOURS TRANSDERMAL EVERY 24 HOURS
Status: DISCONTINUED | OUTPATIENT
Start: 2018-03-07 | End: 2018-03-08

## 2018-03-07 RX ORDER — HYDROCODONE BITARTRATE AND ACETAMINOPHEN 5; 325 MG/1; MG/1
1 TABLET ORAL EVERY 4 HOURS PRN
Status: DISCONTINUED | OUTPATIENT
Start: 2018-03-07 | End: 2018-03-08

## 2018-03-07 NOTE — ANESTHESIA POSTPROCEDURE EVALUATION
[address] Patient Status:  Inpatient   Age/Gender 61year old male MRN [id number]   [facility] 6NE-A Attending Ema Phillip MD   Hosp Day # 0 PCP Claudine Siu MD       Anesthesia Post-op Note    Procedure(s):  Archana Polanco

## 2018-03-07 NOTE — ANESTHESIA PREPROCEDURE EVALUATION
PRE-OP EVALUATION    Patient Name: Ezequiel Mena    Pre-op Diagnosis: carotid artery stenosis    Procedure(s):  left carotid endarterectomy  Keshia Portillo    Surgeon(s) and Role:     Maxi Villagomez MD - Primary    Pre-op vitals reviewed.   Temp: 98.1 °F ( APPENDECTOMY  No date: CAROTID ENDARTERECTOMY Right  No date: HERNIA SURGERY  No date: TONSILLECTOMY     Smoking status: Former Smoker  0.50 Packs/day     Types: Cigarettes    Quit date: 12/14/2017    Smokeless tobacco: Never Used    Alcohol use Yes    Com

## 2018-03-07 NOTE — BRIEF OP NOTE
Pre-Operative Diagnosis: Left carotid artery stenosis     Post-Operative Diagnosis: same     Procedure Performed:   Procedure(s):  Left carotid endarterectomy with Dacron patch angioplasty      Surgeon(s) and Role:     * Ijeoma Brannon MD - Primary

## 2018-03-07 NOTE — OPERATIVE REPORT
[address]    PATIENT'S NAME: Garrison Chawla   ATTENDING PHYSICIAN: Fabienne Fleming M.D. OPERATING PHYSICIAN: Bautista Fleming M.D.    PATIENT ACCOUNT#:   [de-identified]    LOCATION:  [address] [place] [address]  MEDICAL RECORD #:   [id number]       DATE OF BI reestablished to the internal carotid artery. An endarterectomy was then performed of the common internal and external carotid arteries. The artery was carefully cleaned of all debris. The distal intima did not require tacking sutures.   The proximal med

## 2018-03-07 NOTE — PAYOR COMM NOTE
--------------  ADMISSION REVIEW     Payor: N/A  Subscriber #:  No Subscriber Number on File  Authorization Number: N/A    Admit date: 3/7/18  Admit time: [address] [place] [address]       Admitting Physician: Chago Pate MD  Attending Physician:  Chago Pate MD  P PERFORMED:  Left carotid endarterectomy with Dacron patch angioplasty.     PLEASE FAX DAYS CERTIFIED AND NEXT REVIEW DATE

## 2018-03-08 VITALS
HEIGHT: 74 IN | SYSTOLIC BLOOD PRESSURE: 130 MMHG | WEIGHT: 205 LBS | BODY MASS INDEX: 26.31 KG/M2 | TEMPERATURE: 98 F | DIASTOLIC BLOOD PRESSURE: 73 MMHG | HEART RATE: 56 BPM | RESPIRATION RATE: 17 BRPM | OXYGEN SATURATION: 96 %

## 2018-03-08 LAB
ATRIAL RATE: 52 BPM
P AXIS: 80 DEGREES
P-R INTERVAL: 168 MS
Q-T INTERVAL: 434 MS
QRS DURATION: 112 MS
QTC CALCULATION (BEZET): 403 MS
R AXIS: -24 DEGREES
T AXIS: 44 DEGREES
VENTRICULAR RATE: 52 BPM

## 2018-03-08 NOTE — PROGRESS NOTES
Received patient from OR s/p CEA of the left side. Patient alert and oriented x4. 1 episode of headache with relief noted with PRN pain meds. Neuros all intact except for tingling in his left arm which is his baseline. VSS.  Plan of care discussed with tana

## 2018-03-11 LAB — BLOOD TYPE BARCODE: 5100

## 2018-03-12 DIAGNOSIS — I63.9 ACUTE CVA (CEREBROVASCULAR ACCIDENT) (HCC): Primary | ICD-10-CM

## 2018-03-12 RX ORDER — ATORVASTATIN CALCIUM 80 MG/1
80 TABLET, FILM COATED ORAL NIGHTLY
Qty: 30 TABLET | Refills: 2 | Status: SHIPPED | OUTPATIENT
Start: 2018-03-12

## 2018-03-12 NOTE — TELEPHONE ENCOUNTER
Medication: Atorvastatin    Date of last refill: 12/19/17  (#30/2)  [place]    Last office visit: 1/19/2018  Due back to clinic per last office note:  3 months  Date next office visit scheduled:  No future appointmen

## 2018-04-18 ENCOUNTER — TELEPHONE (OUTPATIENT)
Dept: SURGERY | Facility: CLINIC | Age: 63
End: 2018-04-18

## 2018-05-07 PROBLEM — Z98.890 S/P BILATERAL CAROTID ENDARTERECTOMY: Status: ACTIVE | Noted: 2018-05-07

## 2019-06-12 ENCOUNTER — HOSPITAL ENCOUNTER (OUTPATIENT)
Dept: CV DIAGNOSTICS | Facility: HOSPITAL | Age: 64
Discharge: HOME | End: 2019-06-12
Attending: FAMILY MEDICINE
Payer: COMMERCIAL

## 2019-06-12 DIAGNOSIS — R07.2 PRECORDIAL PAIN: ICD-10-CM

## 2019-06-12 DIAGNOSIS — R06.00 DYSPNEA ON EXERTION: ICD-10-CM

## 2019-06-12 PROCEDURE — 93018 CV STRESS TEST I&R ONLY: CPT | Performed by: FAMILY MEDICINE

## 2019-06-12 PROCEDURE — 93017 CV STRESS TEST TRACING ONLY: CPT | Performed by: FAMILY MEDICINE

## 2020-08-14 ENCOUNTER — HOSPITAL ENCOUNTER (OUTPATIENT)
Dept: GENERAL RADIOLOGY | Age: 65
Discharge: HOME | End: 2020-08-14
Attending: PHYSICIAN ASSISTANT
Payer: MEDICARE

## 2020-08-14 ENCOUNTER — HOSPITAL ENCOUNTER (OUTPATIENT)
Dept: ULTRASOUND IMAGING | Age: 65
Discharge: HOME | End: 2020-08-14
Attending: PHYSICIAN ASSISTANT
Payer: MEDICARE

## 2020-08-14 DIAGNOSIS — R05.9 COUGH: ICD-10-CM

## 2020-08-14 DIAGNOSIS — M25.559 HIP PAIN: ICD-10-CM

## 2020-08-14 DIAGNOSIS — I65.23 CAROTID ARTERY OBSTRUCTION, BILATERAL: ICD-10-CM

## 2020-08-14 PROCEDURE — 93880 EXTRACRANIAL BILAT STUDY: CPT | Performed by: PHYSICIAN ASSISTANT

## 2020-08-14 PROCEDURE — 73502 X-RAY EXAM HIP UNI 2-3 VIEWS: CPT | Performed by: PHYSICIAN ASSISTANT

## 2020-08-14 PROCEDURE — 71046 X-RAY EXAM CHEST 2 VIEWS: CPT | Performed by: PHYSICIAN ASSISTANT

## 2021-03-05 DIAGNOSIS — Z23 NEED FOR VACCINATION: ICD-10-CM

## 2022-10-19 NOTE — PLAN OF CARE
Rec'd patient from CNICU to  7642. Pt A&Ox4, VSS, afebrile, c/o right neck incision pain. Neuro's intact except slight left facial droop. Right neck incision with skin glue, cdi. Pt up in room with steady gait, voids without difficulty after gold d/c'd. Treatment Number (Will Not Render If 0): 0 Include Z78.9 (Other Specified Conditions Influencing Health Status) As An Associated Diagnosis?: Yes Render Note In Bullet Format When Appropriate: No Consent: The patient's consent was obtained including but not limited to risks of crusting, scabbing, blistering, scarring, darker or lighter pigmentary change, recurrence, incomplete removal and infection. Detail Level: Detailed Medical Necessity Information: It is in your best interest to select a reason for this procedure from the list below. All of these items fulfill various CMS LCD requirements except the new and changing color options. Medical Necessity Clause: This procedure was medically necessary because the lesions that were treated were: Post-Care Instructions: I reviewed with the patient in detail post-care instructions. Patient is to wear sunprotection, and avoid picking at any of the treated lesions. Pt may apply Vaseline to crusted or scabbing areas. Anesthesia Volume In Cc: 1

## 2023-02-06 ENCOUNTER — HOSPITAL ENCOUNTER (OUTPATIENT)
Dept: ULTRASOUND IMAGING | Age: 68
Discharge: HOME | End: 2023-02-06
Attending: PHYSICIAN ASSISTANT
Payer: MEDICARE

## 2023-02-06 DIAGNOSIS — I65.23 BILATERAL CAROTID ARTERY OCCLUSION: ICD-10-CM

## 2023-02-06 PROCEDURE — 93880 EXTRACRANIAL BILAT STUDY: CPT | Performed by: PHYSICIAN ASSISTANT

## 2023-02-15 ENCOUNTER — HOSPITAL ENCOUNTER (OUTPATIENT)
Dept: GENERAL RADIOLOGY | Age: 68
Discharge: HOME | End: 2023-02-15
Attending: PHYSICIAN ASSISTANT
Payer: MEDICARE

## 2023-02-15 DIAGNOSIS — M25.512 ACUTE PAIN OF LEFT SHOULDER: ICD-10-CM

## 2023-02-15 PROCEDURE — 73030 X-RAY EXAM OF SHOULDER: CPT | Performed by: PHYSICIAN ASSISTANT

## 2023-12-12 ENCOUNTER — HOSPITAL ENCOUNTER (OUTPATIENT)
Dept: GENERAL RADIOLOGY | Age: 68
Discharge: HOME | End: 2023-12-12
Attending: PHYSICIAN ASSISTANT
Payer: MEDICARE

## 2023-12-12 ENCOUNTER — HOSPITAL ENCOUNTER (OUTPATIENT)
Dept: CT IMAGING | Age: 68
Discharge: HOME | End: 2023-12-12
Attending: PHYSICIAN ASSISTANT

## 2023-12-12 DIAGNOSIS — Z13.6 SCREENING FOR ISCHEMIC HEART DISEASE: ICD-10-CM

## 2023-12-12 DIAGNOSIS — M25.551 RIGHT HIP PAIN: ICD-10-CM

## 2023-12-12 PROCEDURE — 73502 X-RAY EXAM HIP UNI 2-3 VIEWS: CPT | Performed by: PHYSICIAN ASSISTANT

## 2023-12-12 NOTE — PROGRESS NOTES
Date of Service 12/12/2023 Sunday Dill  Date of Birth 2/20/1955    Patient Age: 76year old    PCP: Flor Cluod MD  [address] [address]    Heart Scan Consult  Preliminary Heart Scan Score: [address] denies CP or SOB     Results also to Cardiologist Dr Carina Nolan Completed Previously: No        Peripheral Vascular Scan Completed Previously: Yes  Year of last PV screening: history of ken carotid endarterectomy       Risk Factors  Personal Risk Factors  Non-alterable Risk Factors: Age; Family History;Personal History  Alterable Risk Factors: Smoking; Abnormal Cholesterol          Blood Pressure  Blood Pressure measurement declined during this encounter. (Normal =< 120/80,  Elevated = 120-129/ >80,  High Stage1 130-139/80-89 , Stage2 >140/>90)    Lipid Profile  Not completed, patient declined testing. States had recent labs per PCP results not available Pt states takes statin and asa     Cholesterol Goals  Value   Total  =< 200   HDL  = > 45 Men = > 55 Women   LDL   =< 100   Triglycerides  =< 150       Glucose and Hemoglobin A1C     (Normal Fasting Glucose < 100mg/dl )    Nurse Review  Risk factor information and results reviewed with Nurse: Yes    Recommended Follow Up:  Consult your physician regarding[de-identified] Final Heart Scan Report; Discuss potential for Incidental Finding      Recommendations for Change:  Nutrition Changes: Low Saturated Fat;Low Fat Dairy; Low Salt Eating; Increase Fiber         Exercise:  (very active at work) does 44966 steps on average     Smoking Cessation: Not Ready to Quit (smoking cessation information given. readiness is a 5)              Repeat Heart Scan: Discuss with your Physician               [place] Recommended Resources:  Recommended Resources: Upcoming Classes, Medical Services and Health Library www. [facility]. Hamilton Naylor RN        Please Contact the Nurse Heart Line with any Questions or Concerns [phone].

## 2024-03-25 ENCOUNTER — TELEPHONE (OUTPATIENT)
Dept: SURGERY | Facility: HOSPITAL | Age: 69
End: 2024-03-25

## 2024-03-25 NOTE — TELEPHONE ENCOUNTER
ASSESSMENT AND PLAN:   Asim Valadez Sr. is a 69 year old male presents for CRC screening.  Patient has functional loose stool and occ BRBPR hemorrhoid bleeding. He is asymptomatic, at average risk for colon cancer, and = to or > 45.  Needs colon cancer screening with colonoscopy.  Procedure explained, risks of bleeding, perforation discussed.     1. Patient will return for outpatient colonoscopy ([place]).

## 2024-05-01 ENCOUNTER — HOSPITAL ENCOUNTER (OUTPATIENT)
Facility: HOSPITAL | Age: 69
Setting detail: HOSPITAL OUTPATIENT SURGERY
Discharge: HOME | End: 2024-05-01
Attending: INTERNAL MEDICINE | Admitting: INTERNAL MEDICINE
Payer: MEDICARE

## 2024-05-01 VITALS
DIASTOLIC BLOOD PRESSURE: 82 MMHG | OXYGEN SATURATION: 96 % | HEART RATE: 98 BPM | WEIGHT: 208 LBS | HEIGHT: 75 IN | TEMPERATURE: 98 F | RESPIRATION RATE: 18 BRPM | BODY MASS INDEX: 25.86 KG/M2 | SYSTOLIC BLOOD PRESSURE: 150 MMHG

## 2024-05-01 PROCEDURE — 99152 MOD SED SAME PHYS/QHP 5/>YRS: CPT | Performed by: INTERNAL MEDICINE

## 2024-05-01 PROCEDURE — 0DJD8ZZ INSPECTION OF LOWER INTESTINAL TRACT, VIA NATURAL OR ARTIFICIAL OPENING ENDOSCOPIC: ICD-10-PCS | Performed by: INTERNAL MEDICINE

## 2024-05-01 RX ORDER — MIDAZOLAM HYDROCHLORIDE 1 MG/ML
INJECTION INTRAMUSCULAR; INTRAVENOUS
Status: DISCONTINUED | OUTPATIENT
Start: 2024-05-01 | End: 2024-05-01

## 2024-05-01 RX ORDER — SODIUM CHLORIDE, SODIUM LACTATE, POTASSIUM CHLORIDE, CALCIUM CHLORIDE 600; 310; 30; 20 MG/100ML; MG/100ML; MG/100ML; MG/100ML
INJECTION, SOLUTION INTRAVENOUS CONTINUOUS
Status: DISCONTINUED | OUTPATIENT
Start: 2024-05-01 | End: 2024-05-01

## 2024-05-01 NOTE — PRE-SEDATION ASSESSMENT
Physician Pre-Sedation Assessment    Pre-Sedation Assessment:    Sedation History: Previous Sedation with No Complications and Airway Assessed    Cardiac: normal S1, S2  Respiratory: breath sounds clear bilaterally   Abdomen: soft, BS (+), non-tender    ASA Classification: 2. Patient with mild systemic disease    Plan: IV Sedation      "stomach pain"

## 2024-05-01 NOTE — H&P
[facility] GASTROENTEROLOGY    REFERRING PHYSICIAN: Dr. Amanda Valadez is a 69 year old male.  CRC screening    See Rm note reviewed from 3/25/24    PROCEDURE: Colon    Allergies: Patient has no known allergies.  No current outpatient medications on file.     Past Medical History:    Abdominal hernia    Surgically repaired    Acute, but ill-defined, cerebrovascular disease    Bilateral carodidectomy    Decorative tattoo    Gout    Hemorrhoids    High blood pressure    Occlusion and stenosis of carotid artery without mention of cerebral infarction    S/p bilateral carotid endarterectomy    Smoking addiction    Stroke (HCC)    2017 Minor- tingling on left arm,leg,    Wears glasses     Past Surgical History:   Procedure Laterality Date    Appendectomy      Carotid endarterectomy Right     Colonoscopy  2017    Hernia surgery      Tonsillectomy       Social History     Socioeconomic History    Marital status:    Tobacco Use    Smoking status: Every Day     Current packs/day: 0.00     Average packs/day: 0.5 packs/day for 45.0 years (22.5 ttl pk-yrs)     Types: Cigarettes     Start date: 1972     Last attempt to quit: 2017     Years since quittin.3    Smokeless tobacco: Never    Tobacco comments:     Cutting down, <1/2 PPD   Vaping Use    Vaping status: Never Used   Substance and Sexual Activity    Alcohol use: Yes     Alcohol/week: 24.0 standard drinks of alcohol     Types: 24 Cans of beer per week     Comment: 2 beers per day    Drug use: No   Other Topics Concern    Caffeine Concern Yes     Comment: 3-4 cups per day    Exercise No     Comment: active job and dance         Exam:  /71 (BP Location: Left arm)   Pulse 65   Temp 98.1 °F (36.7 °C) (Tympanic)   Resp 16   Ht 6' 3\" (1.905 m)   Wt 208 lb (94.3 kg)   SpO2 98%   BMI 26.00 kg/m²  - Body mass index is 26 kg/m²., A+0x3, HEENT: non-icteric, LUNGS: CTA, HEART: RRR, ABDOMEN:+BS, soft, non-tender, no guarding,  EXTREM: no peripheral edema      ASSESSMENT AND PLAN:  Asim Valadez is a 69 year old male.  CRC screening    1.  Colon    I have discussed the risks and benefits and alternatives with the patient/family.  They understand and agree to proceed with the plan of care.    I have reviewed the History and Physical performed.  I have examined this patient today and any changes are documented above.     Gadiel Mcgill MD  5/1/2024  12:32 PM

## 2024-05-01 NOTE — OPERATIVE REPORT
[facility]    Asim Valadez Patient Status:  Hospital Outpatient Surgery    1955 MRN [id number]   Location [facility] ENDOSCOPY PAIN CENTER Attending Gadiel Mcgill MD   Hosp Day # 0 PCP Corby Patel MD         PATIENT NAME: Asim Valadez  DATE OF OPERATION: 2024    PREOPERATIVE DIAGNOSIS:  CRC screening  POSTOPERATIVE DIAGNOSIS:  Diverticulosis, mild    PROCEDURE PERFORMED: Colonoscopy with conscious sedation  SURGEON: Gadiel Mcgill MD   MEDICATIONS: Fentanyl 100 mcg IV and Versed 4 mg IV in divided doses under the supervision of Dr. Mcgill.  PROCEDURE AND FINDINGS: The patient was placed into the left lateral decubitus position after informed consent was obtained.  All questions were answered.  An ASA score was assigned, Mallampati score 2.  IV sedation was administered.  A rectal exam was performed which was normal.  The Olympus video colonoscope was then introduced through the rectum and advanced through the colon to the cecum. The quality of prep was excellent, adequate, Aronchick 1. The cecum was identified by the ileocecal valve and appendiceal orifice. The colonoscope was then withdrawn and the mucosa was further carefully inspected.  There were a few diverticula noted in the sigmoid colon.    The remainder of the entire examined colon was otherwise normal.  After retroflexion in the rectum, the colonoscope was straightened and removed and the procedure was completed. The patient tolerated the procedure well. There were no implants placed nor significant blood loss. There were no immediate apparent complications.   Total moderate sedation time was 19 minutes. A trained sedation nurse was present to assist in monitoring the patient during the entire length of the moderate sedation time.    RECOMMENDATIONS   Consume a high fiber diet.  No repeat colonoscopy needed    Gadiel Mcgill MD    Copy Dr Patel

## 2024-05-01 NOTE — DISCHARGE INSTRUCTIONS
[facility]    Procedure(s): Colonoscopy    Findings:    1.  Diverticulosis, mild    Disposition:  home    Patient Instructions:     1.  Consume a high fiber diet.  2.  No repeat colonoscopy needed.      Gadiel Mcgill MD      Home Care Instructions for Colonoscopy With Sedation    Diet:  - Resume your regular diet as tolerated unless otherwise instructed.  - start with light meals to minimize bloating.  - Do not drink alcohol today.    Medication:  - If you have questions about resuming your normal medications, please contact your Primary Care Physician.    Activities:  - Take it easy today. Do not return to work today.  - Do not drive today.  - Do not operate any machinery today (including kitchen equipment).    Colonoscopy:  - You may notice some rectal \"spotting\" (a little blood on the toilet tissue) for a day or two after the exam. This is normal.  - If you experience any rectal bleeding (not spotting), persistent tenderness or sharp severe abdominal pains, oral temperature over 100 degrees Farenheit, light-headedness or dizziness, or any other problems, contact your doctor.      **If unable to reach your doctor, please go to the [facility] Emergency Room**    - Your referring physician will receive a full report of your examination.  - If you do not hear from your doctor's office within two weeks of your biopsy, please call them for your results.    Additional Comments/Instructions (if applicable):

## 2024-11-15 ENCOUNTER — HOSPITAL ENCOUNTER (OUTPATIENT)
Dept: MRI IMAGING | Age: 69
Discharge: HOME | End: 2024-11-15
Attending: PHYSICIAN ASSISTANT
Payer: MEDICARE

## 2024-11-15 DIAGNOSIS — M23.92 INTERNAL DERANGEMENT OF LEFT KNEE: ICD-10-CM

## 2024-11-15 PROCEDURE — 73721 MRI JNT OF LWR EXTRE W/O DYE: CPT | Performed by: PHYSICIAN ASSISTANT

## (undated) DEVICE — GLOVE SURG TRIUMPH SZ 8

## (undated) DEVICE — SUNDT™ EXTERNAL CAROTID ENDARTERECTOMY SHUNT: Brand: SUNDT™

## (undated) DEVICE — KIT CUSTOM ENDOPROCEDURE STERIS

## (undated) DEVICE — SUTURE SILK 2-0

## (undated) DEVICE — SUTURE PROLENE 6-0 C-1

## (undated) DEVICE — 3M™ RED DOT™ MONITORING ELECTRODE WITH FOAM TAPE AND STICKY GEL, 50/BAG, 20/CASE, 72/PLT 2570: Brand: RED DOT™

## (undated) DEVICE — BASIC DOUBLE BASIN 1-LF: Brand: MEDLINE INDUSTRIES, INC.

## (undated) DEVICE — SUTURE VICRYL 3-0 SH

## (undated) DEVICE — SUTURE PROLENE 7-0 BV-1

## (undated) DEVICE — 3M™ STERI-STRIP™ REINFORCED ADHESIVE SKIN CLOSURES, R1547, 1/2 IN X 4 IN (12 MM X 100 MM), 6 STRIPS/ENVELOPE: Brand: 3M™ STERI-STRIP™

## (undated) DEVICE — SUTURE SILK 4-0

## (undated) DEVICE — STERILE POLYISOPRENE POWDER-FREE SURGICAL GLOVES: Brand: PROTEXIS

## (undated) DEVICE — 1200CC GUARDIAN II: Brand: GUARDIAN

## (undated) DEVICE — TRANSPOSAL ULTRAFLEX DUO/QUAD ULTRA CART MANIFOLD

## (undated) DEVICE — INTENDED TO BE USED TO OCCLUDE, RETRACT AND IDENTIFY ARTERIES, VEINS, TENDONS AND NERVES IN SURGICAL PROCEDURES: Brand: STERION®  VESSEL LOOP

## (undated) DEVICE — SOL  .9 1000ML BTL

## (undated) DEVICE — DECANTER BAG 9": Brand: MEDLINE INDUSTRIES, INC.

## (undated) DEVICE — CHLORAPREP 26ML APPLICATOR

## (undated) DEVICE — KIT VLV 5 PC AIR H2O SUCT BX ENDOGATOR CONN

## (undated) DEVICE — 10FT COMBINED O2 DELIVERY/CO2 MONITORING. FILTER WITH MICROSTREAM TYPE LUER: Brand: DUAL ADULT NASAL CANNULA

## (undated) DEVICE — 3M™ IOBAN™ 2 ANTIMICROBIAL INCISE DRAPE 6650EZ: Brand: IOBAN™ 2

## (undated) DEVICE — CV PACK-LF: Brand: MEDLINE INDUSTRIES, INC.

## (undated) NOTE — LETTER
[address] [facility] FOR BLOOD OR BLOOD COMPONENTS      In the course of your treatment, it may become necessary to administer a transfusion of blood or blood components.  This form provides basic information concerning this proc explain the alternatives to you if it has not already been done. I,Asim Valadez, have read/had read to me the above. I understand the matters bearing on the decision whether or not to authorize a transfusion of blood or blood components.  I have no quest

## (undated) NOTE — LETTER
[facility]  Bartolo Acosta 61 [address] [facility], [address] [place] [address]    Consent for Operation    Date: __________________    Time: _______________    1. I authorize the performance upon Aden Alexander the following operation:    Right carotid endarterectomy    2.  I videotape. The [facility] will not be responsible for storage or maintenance of this tape. 6. For the purpose of advancing medical education, I consent to the admittance of observers to the Operating Room.     7. I authorize the use of any specimen, organs Signature of Patient:   ___________________________    When the patient is a minor or mentally incompetent to give consent:  Signature of person authorized to consent for patient: ___________________________   Relationship to patient: _____________________ drugs/illegal medications). Failure to inform my anesthesiologist about these medicines may increase my risk of anesthetic complications. · If I am allergic to anything or have had a reaction to anesthesia before.     3. I understand how the anesthesia med I have discussed the procedure and information above with the patient (or patient’s representative) and answered their questions. The patient or their representative has agreed to have anesthesia services.     _______________________________________________

## (undated) NOTE — LETTER
[facility]  Bartolo Acosta 61 [address] [facility], [address] [place] [address]    Consent for Operation    Date: __________________    Time: _______________    1.  I authorize the performance upon Ezequiel Mena the following operation:      left carotid endarterectomy      2 videotape. The [facility] will not be responsible for storage or maintenance of this tape. 6. For the purpose of advancing medical education, I consent to the admittance of observers to the Operating Room.     7. I authorize the use of any specimen, organs Signature of Patient:   ___________________________    When the patient is a minor or mentally incompetent to give consent:  Signature of person authorized to consent for patient: ___________________________   Relationship to patient: _____________________ drugs/illegal medications). Failure to inform my anesthesiologist about these medicines may increase my risk of anesthetic complications. · If I am allergic to anything or have had a reaction to anesthesia before.     3. I understand how the anesthesia med I have discussed the procedure and information above with the patient (or patient’s representative) and answered their questions. The patient or their representative has agreed to have anesthesia services.     _______________________________________________